# Patient Record
Sex: FEMALE | Race: OTHER
[De-identification: names, ages, dates, MRNs, and addresses within clinical notes are randomized per-mention and may not be internally consistent; named-entity substitution may affect disease eponyms.]

---

## 2018-03-21 PROBLEM — Z00.00 ENCOUNTER FOR PREVENTIVE HEALTH EXAMINATION: Status: ACTIVE | Noted: 2018-03-21

## 2018-03-22 ENCOUNTER — RX RENEWAL (OUTPATIENT)
Age: 58
End: 2018-03-22

## 2018-04-23 ENCOUNTER — RX RENEWAL (OUTPATIENT)
Age: 58
End: 2018-04-23

## 2018-04-23 DIAGNOSIS — R21 RASH AND OTHER NONSPECIFIC SKIN ERUPTION: ICD-10-CM

## 2018-05-12 ENCOUNTER — RECORD ABSTRACTING (OUTPATIENT)
Age: 58
End: 2018-05-12

## 2018-05-12 DIAGNOSIS — Z78.9 OTHER SPECIFIED HEALTH STATUS: ICD-10-CM

## 2018-05-12 DIAGNOSIS — Z83.3 FAMILY HISTORY OF DIABETES MELLITUS: ICD-10-CM

## 2018-05-12 RX ORDER — AZELAIC ACID 0.15 G/G
15 GEL TOPICAL
Refills: 0 | Status: ACTIVE | COMMUNITY

## 2018-05-12 RX ORDER — MELOXICAM 15 MG/1
15 TABLET ORAL
Refills: 0 | Status: ACTIVE | COMMUNITY

## 2018-05-16 ENCOUNTER — LABORATORY RESULT (OUTPATIENT)
Age: 58
End: 2018-05-16

## 2018-05-16 ENCOUNTER — APPOINTMENT (OUTPATIENT)
Dept: INTERNAL MEDICINE | Facility: CLINIC | Age: 58
End: 2018-05-16
Payer: COMMERCIAL

## 2018-05-16 VITALS
SYSTOLIC BLOOD PRESSURE: 148 MMHG | HEIGHT: 64 IN | DIASTOLIC BLOOD PRESSURE: 82 MMHG | BODY MASS INDEX: 48.32 KG/M2 | HEART RATE: 91 BPM | WEIGHT: 283 LBS

## 2018-05-16 DIAGNOSIS — R53.81 CHRONIC FATIGUE, UNSPECIFIED: ICD-10-CM

## 2018-05-16 DIAGNOSIS — L30.4 ERYTHEMA INTERTRIGO: ICD-10-CM

## 2018-05-16 DIAGNOSIS — R53.82 CHRONIC FATIGUE, UNSPECIFIED: ICD-10-CM

## 2018-05-16 PROCEDURE — 36415 COLL VENOUS BLD VENIPUNCTURE: CPT

## 2018-05-16 PROCEDURE — 99214 OFFICE O/P EST MOD 30 MIN: CPT | Mod: 25

## 2018-05-16 RX ORDER — TERBINAFINE HYDROCHLORIDE 250 MG/1
250 TABLET ORAL
Refills: 0 | Status: DISCONTINUED | COMMUNITY
End: 2018-05-16

## 2018-05-16 RX ORDER — GENTAMICIN SULFATE 3 MG/G
0.3 OINTMENT OPHTHALMIC
Refills: 0 | Status: DISCONTINUED | COMMUNITY
End: 2018-05-16

## 2018-05-16 NOTE — HISTORY OF PRESENT ILLNESS
[FreeTextEntry1] : follow up hypertension [de-identified] : patient with hypertension, hypothyroidism, for follow up, conditions stables, complains of general malaise, fatigue, erythematous wet rash between fingers left hand chronic for more than five months ,recurrent

## 2018-05-16 NOTE — REVIEW OF SYSTEMS
[Fatigue] : fatigue [Skin Rash] : skin rash [Negative] : Neurological [de-identified] : rash between fingers left hand

## 2018-05-16 NOTE — PHYSICAL EXAM
[No Acute Distress] : no acute distress [Well-Appearing] : well-appearing [Normal Sclera/Conjunctiva] : normal sclera/conjunctiva [PERRL] : pupils equal round and reactive to light [EOMI] : extraocular movements intact [Normal Outer Ear/Nose] : the outer ears and nose were normal in appearance [Normal Oropharynx] : the oropharynx was normal [No JVD] : no jugular venous distention [Supple] : supple [No Lymphadenopathy] : no lymphadenopathy [Thyroid Normal, No Nodules] : the thyroid was normal and there were no nodules present [No Respiratory Distress] : no respiratory distress  [Clear to Auscultation] : lungs were clear to auscultation bilaterally [Normal Rate] : normal rate  [Regular Rhythm] : with a regular rhythm [Normal S1, S2] : normal S1 and S2 [No Murmur] : no murmur heard [Soft] : abdomen soft [Non Tender] : non-tender [No HSM] : no HSM [Normal Bowel Sounds] : normal bowel sounds [Normal Gait] : normal gait [No Focal Deficits] : no focal deficits [de-identified] : scaly, erythematous, cracked skin between middle and ring fingers left hand

## 2018-05-17 LAB
25(OH)D3 SERPL-MCNC: 16.4 NG/ML
ALBUMIN SERPL ELPH-MCNC: 4.3 G/DL
ALP BLD-CCNC: 91 U/L
ALT SERPL-CCNC: 20 U/L
ANION GAP SERPL CALC-SCNC: 16 MMOL/L
APPEARANCE: CLEAR
AST SERPL-CCNC: 18 U/L
BACTERIA: NEGATIVE
BASOPHILS # BLD AUTO: 0 K/UL
BASOPHILS NFR BLD AUTO: 0 %
BILIRUB SERPL-MCNC: 0.3 MG/DL
BILIRUBIN URINE: NEGATIVE
BLOOD URINE: NEGATIVE
BUN SERPL-MCNC: 15 MG/DL
CALCIUM SERPL-MCNC: 9.3 MG/DL
CHLORIDE SERPL-SCNC: 102 MMOL/L
CHOLEST SERPL-MCNC: 198 MG/DL
CHOLEST/HDLC SERPL: 3.2 RATIO
CO2 SERPL-SCNC: 22 MMOL/L
COLOR: YELLOW
CREAT SERPL-MCNC: 0.71 MG/DL
EOSINOPHIL # BLD AUTO: 0.12 K/UL
EOSINOPHIL NFR BLD AUTO: 1.8 %
GLUCOSE QUALITATIVE U: NEGATIVE MG/DL
GLUCOSE SERPL-MCNC: 115 MG/DL
HBA1C MFR BLD HPLC: 5.8 %
HCT VFR BLD CALC: 41.4 %
HDLC SERPL-MCNC: 62 MG/DL
HGB BLD-MCNC: 13.2 G/DL
HYALINE CASTS: 1 /LPF
KETONES URINE: NEGATIVE
LDLC SERPL CALC-MCNC: 102 MG/DL
LEUKOCYTE ESTERASE URINE: NEGATIVE
LYMPHOCYTES # BLD AUTO: 1.8 K/UL
LYMPHOCYTES NFR BLD AUTO: 26.3 %
MAN DIFF?: NORMAL
MCHC RBC-ENTMCNC: 31.4 PG
MCHC RBC-ENTMCNC: 31.9 GM/DL
MCV RBC AUTO: 98.6 FL
MICROSCOPIC-UA: NORMAL
MONOCYTES # BLD AUTO: 0.3 K/UL
MONOCYTES NFR BLD AUTO: 4.4 %
NEUTROPHILS # BLD AUTO: 4.45 K/UL
NEUTROPHILS NFR BLD AUTO: 64.8 %
NITRITE URINE: NEGATIVE
PH URINE: 7
PLATELET # BLD AUTO: 280 K/UL
POTASSIUM SERPL-SCNC: 4.8 MMOL/L
PROT SERPL-MCNC: 6.8 G/DL
PROTEIN URINE: NEGATIVE MG/DL
RBC # BLD: 4.2 M/UL
RBC # FLD: 13.4 %
RED BLOOD CELLS URINE: 1 /HPF
SODIUM SERPL-SCNC: 140 MMOL/L
SPECIFIC GRAVITY URINE: 1.02
SQUAMOUS EPITHELIAL CELLS: 1 /HPF
T4 FREE SERPL-MCNC: 1.2 NG/DL
TRIGL SERPL-MCNC: 170 MG/DL
TSH SERPL-ACNC: 4.13 UIU/ML
UROBILINOGEN URINE: NEGATIVE MG/DL
VIT B12 SERPL-MCNC: 727 PG/ML
WBC # FLD AUTO: 6.86 K/UL
WHITE BLOOD CELLS URINE: 0 /HPF

## 2018-05-31 ENCOUNTER — APPOINTMENT (OUTPATIENT)
Dept: INTERNAL MEDICINE | Facility: CLINIC | Age: 58
End: 2018-05-31
Payer: COMMERCIAL

## 2018-05-31 VITALS
BODY MASS INDEX: 47.32 KG/M2 | HEIGHT: 65 IN | HEART RATE: 90 BPM | SYSTOLIC BLOOD PRESSURE: 145 MMHG | DIASTOLIC BLOOD PRESSURE: 84 MMHG | WEIGHT: 284 LBS

## 2018-05-31 DIAGNOSIS — M54.5 LOW BACK PAIN: ICD-10-CM

## 2018-05-31 PROCEDURE — 99214 OFFICE O/P EST MOD 30 MIN: CPT

## 2018-05-31 NOTE — PHYSICAL EXAM
[No Acute Distress] : no acute distress [Well-Appearing] : well-appearing [Normal Sclera/Conjunctiva] : normal sclera/conjunctiva [PERRL] : pupils equal round and reactive to light [EOMI] : extraocular movements intact [No JVD] : no jugular venous distention [Supple] : supple [No Lymphadenopathy] : no lymphadenopathy [Thyroid Normal, No Nodules] : the thyroid was normal and there were no nodules present [No Respiratory Distress] : no respiratory distress  [Clear to Auscultation] : lungs were clear to auscultation bilaterally [Normal Rate] : normal rate  [Regular Rhythm] : with a regular rhythm [Normal S1, S2] : normal S1 and S2 [No Murmur] : no murmur heard [Soft] : abdomen soft [Non Tender] : non-tender [No HSM] : no HSM [Normal Bowel Sounds] : normal bowel sounds [No Focal Deficits] : no focal deficits

## 2018-05-31 NOTE — HISTORY OF PRESENT ILLNESS
[FreeTextEntry1] : LAB RESULTS [de-identified] : patient here today to review and discuss labs results, no acute complains\par

## 2018-05-31 NOTE — ASSESSMENT
[FreeTextEntry1] : low cholesterol, low triglycerides diet,dietary counseling given; dietary avoidance discussed; diet and exercise reviewed with patient\par Dietary counseling given, dietary avoidance discussed, diet and exercise reviewed with patient; patient reminded of importance of aerobic exercise, weight control, dietary compliance and regular glucose monitoring\par

## 2018-06-21 ENCOUNTER — RX RENEWAL (OUTPATIENT)
Age: 58
End: 2018-06-21

## 2018-07-23 ENCOUNTER — MEDICATION RENEWAL (OUTPATIENT)
Age: 58
End: 2018-07-23

## 2018-07-31 ENCOUNTER — RX RENEWAL (OUTPATIENT)
Age: 58
End: 2018-07-31

## 2018-09-05 ENCOUNTER — RECORD ABSTRACTING (OUTPATIENT)
Age: 58
End: 2018-09-05

## 2018-09-06 ENCOUNTER — APPOINTMENT (OUTPATIENT)
Dept: INTERNAL MEDICINE | Facility: CLINIC | Age: 58
End: 2018-09-06
Payer: COMMERCIAL

## 2018-09-06 PROCEDURE — 36415 COLL VENOUS BLD VENIPUNCTURE: CPT

## 2018-09-07 LAB
25(OH)D3 SERPL-MCNC: 19.4 NG/ML
ALBUMIN SERPL ELPH-MCNC: 4.2 G/DL
ALP BLD-CCNC: 93 U/L
ALT SERPL-CCNC: 18 U/L
ANION GAP SERPL CALC-SCNC: 16 MMOL/L
AST SERPL-CCNC: 17 U/L
BILIRUB SERPL-MCNC: 0.4 MG/DL
BUN SERPL-MCNC: 11 MG/DL
CALCIUM SERPL-MCNC: 9 MG/DL
CHLORIDE SERPL-SCNC: 104 MMOL/L
CHOLEST SERPL-MCNC: 188 MG/DL
CHOLEST/HDLC SERPL: 3.4 RATIO
CO2 SERPL-SCNC: 22 MMOL/L
CREAT SERPL-MCNC: 0.61 MG/DL
GLUCOSE SERPL-MCNC: 108 MG/DL
HDLC SERPL-MCNC: 56 MG/DL
LDLC SERPL CALC-MCNC: 101 MG/DL
POTASSIUM SERPL-SCNC: 4.7 MMOL/L
PROT SERPL-MCNC: 6.7 G/DL
SODIUM SERPL-SCNC: 142 MMOL/L
TRIGL SERPL-MCNC: 155 MG/DL

## 2018-09-08 LAB — HBA1C MFR BLD HPLC: 5.9 %

## 2018-09-11 ENCOUNTER — RX RENEWAL (OUTPATIENT)
Age: 58
End: 2018-09-11

## 2018-09-26 ENCOUNTER — APPOINTMENT (OUTPATIENT)
Dept: INTERNAL MEDICINE | Facility: CLINIC | Age: 58
End: 2018-09-26
Payer: COMMERCIAL

## 2018-09-26 VITALS
HEART RATE: 87 BPM | SYSTOLIC BLOOD PRESSURE: 144 MMHG | HEIGHT: 65 IN | WEIGHT: 281 LBS | BODY MASS INDEX: 46.82 KG/M2 | DIASTOLIC BLOOD PRESSURE: 92 MMHG

## 2018-09-26 PROCEDURE — 99214 OFFICE O/P EST MOD 30 MIN: CPT

## 2018-09-26 RX ORDER — HALOBETASOL PROPIONATE 0.5 MG/G
0.05 OINTMENT TOPICAL TWICE DAILY
Qty: 1 | Refills: 1 | Status: DISCONTINUED | COMMUNITY
Start: 2018-09-11 | End: 2018-09-26

## 2018-09-26 NOTE — PLAN
[FreeTextEntry1] : low cholesterol, low triglycerides diet,dietary counseling given; dietary avoidance discussed; diet and exercise reviewed with patient\par Dietary counseling given, dietary avoidance discussed, diet and exercise reviewed with patient; patient reminded of importance of aerobic exercise, weight control, dietary compliance and regular glucose monitoring\par follow up in three months for labs; she had flu vaccine applied at [facility] last month

## 2018-09-26 NOTE — HISTORY OF PRESENT ILLNESS
[FreeTextEntry1] : lab results\par Interview and discussion conducted in [place] by [place] speaking Physician.\par  [de-identified] : patient here today to review and discuss labs results, no acute complains\par

## 2018-10-19 ENCOUNTER — RX RENEWAL (OUTPATIENT)
Age: 58
End: 2018-10-19

## 2018-12-19 ENCOUNTER — APPOINTMENT (OUTPATIENT)
Dept: INTERNAL MEDICINE | Facility: CLINIC | Age: 58
End: 2018-12-19
Payer: COMMERCIAL

## 2018-12-19 ENCOUNTER — RX RENEWAL (OUTPATIENT)
Age: 58
End: 2018-12-19

## 2018-12-19 PROCEDURE — 36415 COLL VENOUS BLD VENIPUNCTURE: CPT

## 2018-12-20 LAB
25(OH)D3 SERPL-MCNC: 21.3 NG/ML
ALBUMIN SERPL ELPH-MCNC: 4.3 G/DL
ALP BLD-CCNC: 87 U/L
ALT SERPL-CCNC: 17 U/L
ANION GAP SERPL CALC-SCNC: 10 MMOL/L
AST SERPL-CCNC: 15 U/L
BILIRUB SERPL-MCNC: 0.5 MG/DL
BUN SERPL-MCNC: 15 MG/DL
CALCIUM SERPL-MCNC: 9 MG/DL
CHLORIDE SERPL-SCNC: 104 MMOL/L
CHOLEST SERPL-MCNC: 177 MG/DL
CHOLEST/HDLC SERPL: 3.1 RATIO
CO2 SERPL-SCNC: 27 MMOL/L
CREAT SERPL-MCNC: 0.56 MG/DL
GLUCOSE SERPL-MCNC: 112 MG/DL
HBA1C MFR BLD HPLC: 5.7 %
HDLC SERPL-MCNC: 57 MG/DL
LDLC SERPL CALC-MCNC: 91 MG/DL
POTASSIUM SERPL-SCNC: 4.6 MMOL/L
PROT SERPL-MCNC: 6.7 G/DL
SODIUM SERPL-SCNC: 141 MMOL/L
T3FREE SERPL-MCNC: 2.98 PG/ML
T4 FREE SERPL-MCNC: 1.2 NG/DL
TRIGL SERPL-MCNC: 147 MG/DL
TSH SERPL-ACNC: 4.03 UIU/ML

## 2019-01-16 ENCOUNTER — APPOINTMENT (OUTPATIENT)
Dept: INTERNAL MEDICINE | Facility: CLINIC | Age: 59
End: 2019-01-16
Payer: COMMERCIAL

## 2019-01-16 VITALS
OXYGEN SATURATION: 93 % | DIASTOLIC BLOOD PRESSURE: 83 MMHG | HEIGHT: 65 IN | BODY MASS INDEX: 45.98 KG/M2 | SYSTOLIC BLOOD PRESSURE: 161 MMHG | HEART RATE: 77 BPM | TEMPERATURE: 97.4 F | WEIGHT: 276 LBS

## 2019-01-16 DIAGNOSIS — R05 COUGH: ICD-10-CM

## 2019-01-16 PROCEDURE — 99214 OFFICE O/P EST MOD 30 MIN: CPT

## 2019-01-16 RX ORDER — PROMETHAZINE HYDROCHLORIDE AND DEXTROMETHORPHAN HYDROBROMIDE ORAL SOLUTION 15; 6.25 MG/5ML; MG/5ML
6.25-15 SOLUTION ORAL
Qty: 210 | Refills: 0 | Status: ACTIVE | COMMUNITY
Start: 2019-01-16 | End: 1900-01-01

## 2019-01-16 NOTE — PLAN
[FreeTextEntry1] : low cholesterol, low triglycerides diet,dietary counseling given; dietary avoidance discussed; diet and exercise reviewed with patient\par Dietary counseling given, dietary avoidance discussed, diet and exercise reviewed with patient; patient reminded of importance of aerobic exercise, weight control, dietary compliance and regular glucose monitoring\par follow up in three months for general labs

## 2019-01-16 NOTE — HISTORY OF PRESENT ILLNESS
[FreeTextEntry1] : lab results\par cough\par headache\par high blood pressure \par Interview and discussion conducted in Albanian by Albanian speaking Physician.\par  [de-identified] : patient here today to review and discuss labs results, complains of resolving cold symptoms, cough productive of yellowish sputum, headache\par

## 2019-01-22 ENCOUNTER — RX RENEWAL (OUTPATIENT)
Age: 59
End: 2019-01-22

## 2019-05-22 ENCOUNTER — RX RENEWAL (OUTPATIENT)
Age: 59
End: 2019-05-22

## 2019-06-12 ENCOUNTER — APPOINTMENT (OUTPATIENT)
Dept: INTERNAL MEDICINE | Facility: CLINIC | Age: 59
End: 2019-06-12
Payer: COMMERCIAL

## 2019-06-12 PROCEDURE — 36415 COLL VENOUS BLD VENIPUNCTURE: CPT

## 2019-06-13 LAB
ALBUMIN SERPL ELPH-MCNC: 4.2 G/DL
ALP BLD-CCNC: 90 U/L
ALT SERPL-CCNC: 27 U/L
ANION GAP SERPL CALC-SCNC: 13 MMOL/L
APPEARANCE: CLEAR
AST SERPL-CCNC: 22 U/L
BACTERIA: ABNORMAL
BASOPHILS # BLD AUTO: 0.05 K/UL
BASOPHILS NFR BLD AUTO: 0.7 %
BILIRUB SERPL-MCNC: 0.3 MG/DL
BILIRUBIN URINE: NEGATIVE
BLOOD URINE: NEGATIVE
BUN SERPL-MCNC: 11 MG/DL
CALCIUM SERPL-MCNC: 8.8 MG/DL
CHLORIDE SERPL-SCNC: 103 MMOL/L
CHOLEST SERPL-MCNC: 194 MG/DL
CHOLEST/HDLC SERPL: 3.3 RATIO
CO2 SERPL-SCNC: 25 MMOL/L
COLOR: NORMAL
CREAT SERPL-MCNC: 0.59 MG/DL
EOSINOPHIL # BLD AUTO: 0.17 K/UL
EOSINOPHIL NFR BLD AUTO: 2.5 %
ESTIMATED AVERAGE GLUCOSE: 120 MG/DL
GLUCOSE QUALITATIVE U: NEGATIVE
GLUCOSE SERPL-MCNC: 103 MG/DL
HBA1C MFR BLD HPLC: 5.8 %
HCT VFR BLD CALC: 42.9 %
HDLC SERPL-MCNC: 58 MG/DL
HGB BLD-MCNC: 13.2 G/DL
HYALINE CASTS: 0 /LPF
IMM GRANULOCYTES NFR BLD AUTO: 1.6 %
KETONES URINE: NEGATIVE
LDLC SERPL CALC-MCNC: 103 MG/DL
LEUKOCYTE ESTERASE URINE: NEGATIVE
LYMPHOCYTES # BLD AUTO: 1.92 K/UL
LYMPHOCYTES NFR BLD AUTO: 28.5 %
MAN DIFF?: NORMAL
MCHC RBC-ENTMCNC: 30.8 GM/DL
MCHC RBC-ENTMCNC: 32.3 PG
MCV RBC AUTO: 104.9 FL
MICROSCOPIC-UA: NORMAL
MONOCYTES # BLD AUTO: 0.5 K/UL
MONOCYTES NFR BLD AUTO: 7.4 %
NEUTROPHILS # BLD AUTO: 3.98 K/UL
NEUTROPHILS NFR BLD AUTO: 59.3 %
NITRITE URINE: NEGATIVE
PH URINE: 6.5
PLATELET # BLD AUTO: 265 K/UL
POTASSIUM SERPL-SCNC: 4.4 MMOL/L
PROT SERPL-MCNC: 6.5 G/DL
PROTEIN URINE: NEGATIVE
RBC # BLD: 4.09 M/UL
RBC # FLD: 13.2 %
RED BLOOD CELLS URINE: 1 /HPF
SODIUM SERPL-SCNC: 141 MMOL/L
SPECIFIC GRAVITY URINE: 1.02
SQUAMOUS EPITHELIAL CELLS: 4 /HPF
T4 FREE SERPL-MCNC: 1.1 NG/DL
TRIGL SERPL-MCNC: 164 MG/DL
TSH SERPL-ACNC: 5.98 UIU/ML
UROBILINOGEN URINE: NORMAL
VIT B12 SERPL-MCNC: 1297 PG/ML
WBC # FLD AUTO: 6.73 K/UL
WHITE BLOOD CELLS URINE: 1 /HPF

## 2019-06-14 LAB — 25(OH)D3 SERPL-MCNC: 16 NG/ML

## 2019-07-03 ENCOUNTER — RX CHANGE (OUTPATIENT)
Age: 59
End: 2019-07-03

## 2019-07-03 ENCOUNTER — APPOINTMENT (OUTPATIENT)
Dept: INTERNAL MEDICINE | Facility: CLINIC | Age: 59
End: 2019-07-03
Payer: MEDICAID

## 2019-07-03 VITALS
RESPIRATION RATE: 16 BRPM | HEART RATE: 77 BPM | HEIGHT: 65 IN | BODY MASS INDEX: 45.98 KG/M2 | SYSTOLIC BLOOD PRESSURE: 163 MMHG | DIASTOLIC BLOOD PRESSURE: 100 MMHG | WEIGHT: 276 LBS | OXYGEN SATURATION: 96 % | TEMPERATURE: 98.5 F

## 2019-07-03 PROCEDURE — 99214 OFFICE O/P EST MOD 30 MIN: CPT

## 2019-07-03 RX ORDER — CLOBETASOL PROPIONATE 0.5 MG/G
0.05 OINTMENT TOPICAL TWICE DAILY
Qty: 1 | Refills: 0 | Status: DISCONTINUED | COMMUNITY
Start: 2018-04-23 | End: 2019-07-03

## 2019-07-03 RX ORDER — LOSARTAN POTASSIUM 50 MG/1
50 TABLET, FILM COATED ORAL
Qty: 90 | Refills: 1 | Status: DISCONTINUED | COMMUNITY
Start: 2018-04-23 | End: 2019-07-03

## 2019-07-03 NOTE — HISTORY OF PRESENT ILLNESS
[FreeTextEntry1] : lab results\par Interview and discussion conducted in Nepalese by Nepalese speaking Physician.\par  [de-identified] : patient here today to review and discuss labs results, no acute complains\par

## 2019-07-03 NOTE — PLAN
[FreeTextEntry1] : Compliance issues discussed; low sodium diet reinforced; weight loss recommended.\par low cholesterol, low triglycerides diet,dietary counseling given; dietary avoidance discussed; diet and exercise reviewed with patient\par follow up in three months for labs CMP, LIPIDS, VITAMIN D ,TFT

## 2019-07-03 NOTE — PHYSICAL EXAM
[No Acute Distress] : no acute distress [Well Nourished] : well nourished [Well Developed] : well developed [Well-Appearing] : well-appearing [Normal Sclera/Conjunctiva] : normal sclera/conjunctiva [PERRL] : pupils equal round and reactive to light [EOMI] : extraocular movements intact [Normal Outer Ear/Nose] : the outer ears and nose were normal in appearance [Normal Oropharynx] : the oropharynx was normal [No JVD] : no jugular venous distention [Thyroid Normal, No Nodules] : the thyroid was normal and there were no nodules present [Supple] : supple [No Lymphadenopathy] : no lymphadenopathy [No Respiratory Distress] : no respiratory distress  [No Accessory Muscle Use] : no accessory muscle use [Clear to Auscultation] : lungs were clear to auscultation bilaterally [Normal Rate] : normal rate  [Regular Rhythm] : with a regular rhythm [Normal S1, S2] : normal S1 and S2 [No Murmur] : no murmur heard [No Abdominal Bruit] : a ~M bruit was not heard ~T in the abdomen [No Carotid Bruits] : no carotid bruits [No Varicosities] : no varicosities [Pedal Pulses Present] : the pedal pulses are present [No Edema] : there was no peripheral edema [No Palpable Aorta] : no palpable aorta [No Extremity Clubbing/Cyanosis] : no extremity clubbing/cyanosis [Soft] : abdomen soft [Non Tender] : non-tender [Non-distended] : non-distended [No Masses] : no abdominal mass palpated [No HSM] : no HSM [Normal Bowel Sounds] : normal bowel sounds [Normal Posterior Cervical Nodes] : no posterior cervical lymphadenopathy [Normal Anterior Cervical Nodes] : no anterior cervical lymphadenopathy [No CVA Tenderness] : no CVA  tenderness [No Spinal Tenderness] : no spinal tenderness [No Joint Swelling] : no joint swelling [Grossly Normal Strength/Tone] : grossly normal strength/tone [No Rash] : no rash [Coordination Grossly Intact] : coordination grossly intact [No Focal Deficits] : no focal deficits [Normal Gait] : normal gait [Deep Tendon Reflexes (DTR)] : deep tendon reflexes were 2+ and symmetric [Normal Affect] : the affect was normal [Normal Insight/Judgement] : insight and judgment were intact

## 2019-10-16 ENCOUNTER — APPOINTMENT (OUTPATIENT)
Dept: INTERNAL MEDICINE | Facility: CLINIC | Age: 59
End: 2019-10-16
Payer: COMMERCIAL

## 2019-10-16 PROCEDURE — 36415 COLL VENOUS BLD VENIPUNCTURE: CPT

## 2019-10-17 LAB
25(OH)D3 SERPL-MCNC: 27.1 NG/ML
ALBUMIN SERPL ELPH-MCNC: 4.3 G/DL
ALP BLD-CCNC: 93 U/L
ALT SERPL-CCNC: 20 U/L
ANION GAP SERPL CALC-SCNC: 17 MMOL/L
AST SERPL-CCNC: 16 U/L
BILIRUB SERPL-MCNC: 0.3 MG/DL
BUN SERPL-MCNC: 15 MG/DL
CALCIUM SERPL-MCNC: 9.5 MG/DL
CHLORIDE SERPL-SCNC: 101 MMOL/L
CHOLEST SERPL-MCNC: 188 MG/DL
CHOLEST/HDLC SERPL: 3.4 RATIO
CO2 SERPL-SCNC: 23 MMOL/L
CREAT SERPL-MCNC: 0.6 MG/DL
GLUCOSE SERPL-MCNC: 113 MG/DL
HDLC SERPL-MCNC: 55 MG/DL
LDLC SERPL CALC-MCNC: 97 MG/DL
POTASSIUM SERPL-SCNC: 4 MMOL/L
PROT SERPL-MCNC: 7.1 G/DL
SODIUM SERPL-SCNC: 141 MMOL/L
T3FREE SERPL-MCNC: 2.78 PG/ML
T4 FREE SERPL-MCNC: 1.1 NG/DL
TRIGL SERPL-MCNC: 179 MG/DL
TSH SERPL-ACNC: 3.9 UIU/ML

## 2019-10-31 ENCOUNTER — APPOINTMENT (OUTPATIENT)
Dept: INTERNAL MEDICINE | Facility: CLINIC | Age: 59
End: 2019-10-31
Payer: COMMERCIAL

## 2019-10-31 VITALS
RESPIRATION RATE: 17 BRPM | WEIGHT: 281 LBS | OXYGEN SATURATION: 94 % | HEART RATE: 88 BPM | DIASTOLIC BLOOD PRESSURE: 82 MMHG | TEMPERATURE: 97.9 F | SYSTOLIC BLOOD PRESSURE: 145 MMHG | BODY MASS INDEX: 46.76 KG/M2

## 2019-10-31 DIAGNOSIS — R73.01 IMPAIRED FASTING GLUCOSE: ICD-10-CM

## 2019-10-31 DIAGNOSIS — L29.2 PRURITUS VULVAE: ICD-10-CM

## 2019-10-31 PROCEDURE — 36415 COLL VENOUS BLD VENIPUNCTURE: CPT

## 2019-10-31 PROCEDURE — G0009: CPT

## 2019-10-31 PROCEDURE — 99214 OFFICE O/P EST MOD 30 MIN: CPT | Mod: 25

## 2019-10-31 PROCEDURE — 90670 PCV13 VACCINE IM: CPT

## 2019-10-31 RX ORDER — FLUCONAZOLE 150 MG/1
150 TABLET ORAL
Qty: 2 | Refills: 0 | Status: ACTIVE | COMMUNITY
Start: 2019-10-31 | End: 1900-01-01

## 2019-10-31 NOTE — HISTORY OF PRESENT ILLNESS
[FreeTextEntry1] : lab results\par Interview and discussion conducted in Filipino by Filipino speaking Physician.\par  [de-identified] : 59 years old female here to review and discuss labs results, complains of chronic left ear rash behind lobe with pruritus, also vulvar pruritus

## 2019-10-31 NOTE — PLAN
[FreeTextEntry1] : low cholesterol, low triglycerides diet,dietary counseling given; dietary avoidance discussed; diet and exercise reviewed with patient\par Dietary counseling given, dietary avoidance discussed, diet and exercise reviewed with patient; patient reminded of importance of aerobic exercise, weight control, dietary compliance and regular glucose monitoring\par follow up in three months for labs CMP, LIPIDS, A1C, TFT

## 2019-10-31 NOTE — PHYSICAL EXAM
[No Acute Distress] : no acute distress [Well Nourished] : well nourished [Well Developed] : well developed [Well-Appearing] : well-appearing [Normal Sclera/Conjunctiva] : normal sclera/conjunctiva [PERRL] : pupils equal round and reactive to light [EOMI] : extraocular movements intact [Normal Outer Ear/Nose] : the outer ears and nose were normal in appearance [Normal Oropharynx] : the oropharynx was normal [No JVD] : no jugular venous distention [No Lymphadenopathy] : no lymphadenopathy [Supple] : supple [Thyroid Normal, No Nodules] : the thyroid was normal and there were no nodules present [No Respiratory Distress] : no respiratory distress  [No Accessory Muscle Use] : no accessory muscle use [Clear to Auscultation] : lungs were clear to auscultation bilaterally [Normal Rate] : normal rate  [Regular Rhythm] : with a regular rhythm [Normal S1, S2] : normal S1 and S2 [No Murmur] : no murmur heard [No Carotid Bruits] : no carotid bruits [No Abdominal Bruit] : a ~M bruit was not heard ~T in the abdomen [No Varicosities] : no varicosities [Pedal Pulses Present] : the pedal pulses are present [No Edema] : there was no peripheral edema [No Palpable Aorta] : no palpable aorta [No Extremity Clubbing/Cyanosis] : no extremity clubbing/cyanosis [Soft] : abdomen soft [Non Tender] : non-tender [Non-distended] : non-distended [No Masses] : no abdominal mass palpated [No HSM] : no HSM [Normal Bowel Sounds] : normal bowel sounds [Normal Posterior Cervical Nodes] : no posterior cervical lymphadenopathy [Normal Anterior Cervical Nodes] : no anterior cervical lymphadenopathy [No CVA Tenderness] : no CVA  tenderness [No Spinal Tenderness] : no spinal tenderness [No Joint Swelling] : no joint swelling [Grossly Normal Strength/Tone] : grossly normal strength/tone [Coordination Grossly Intact] : coordination grossly intact [No Focal Deficits] : no focal deficits [Normal Gait] : normal gait [Deep Tendon Reflexes (DTR)] : deep tendon reflexes were 2+ and symmetric [Normal Affect] : the affect was normal [Normal Insight/Judgement] : insight and judgment were intact [de-identified] : erythematous ,scaly rash behind left ear lobe

## 2019-11-01 LAB
ESTIMATED AVERAGE GLUCOSE: 123 MG/DL
HBA1C MFR BLD HPLC: 5.9 %

## 2019-12-05 ENCOUNTER — RX RENEWAL (OUTPATIENT)
Age: 59
End: 2019-12-05

## 2020-01-08 RX ORDER — NAPROXEN 500 MG/1
500 TABLET ORAL
Qty: 30 | Refills: 2 | Status: ACTIVE | COMMUNITY
Start: 2018-03-22 | End: 1900-01-01

## 2020-01-30 ENCOUNTER — APPOINTMENT (OUTPATIENT)
Dept: INTERNAL MEDICINE | Facility: CLINIC | Age: 60
End: 2020-01-30

## 2020-03-30 RX ORDER — SIMVASTATIN 20 MG/1
20 TABLET, FILM COATED ORAL
Qty: 30 | Refills: 2 | Status: COMPLETED | COMMUNITY
Start: 2018-04-23 | End: 2020-03-30

## 2020-03-30 RX ORDER — SIMVASTATIN 20 MG/1
20 TABLET, FILM COATED ORAL DAILY
Qty: 90 | Refills: 1 | Status: ACTIVE | COMMUNITY
Start: 1900-01-01 | End: 1900-01-01

## 2020-04-22 ENCOUNTER — RX RENEWAL (OUTPATIENT)
Age: 60
End: 2020-04-22

## 2020-05-19 ENCOUNTER — RX RENEWAL (OUTPATIENT)
Age: 60
End: 2020-05-19

## 2020-05-19 RX ORDER — NAPROXEN 500 MG/1
500 TABLET ORAL
Qty: 30 | Refills: 0 | Status: ACTIVE | COMMUNITY
Start: 2018-07-31 | End: 1900-01-01

## 2020-06-22 ENCOUNTER — NON-APPOINTMENT (OUTPATIENT)
Age: 60
End: 2020-06-22

## 2020-06-22 ENCOUNTER — APPOINTMENT (OUTPATIENT)
Dept: INTERNAL MEDICINE | Facility: CLINIC | Age: 60
End: 2020-06-22
Payer: MEDICAID

## 2020-06-22 VITALS
WEIGHT: 272 LBS | TEMPERATURE: 96.5 F | HEART RATE: 92 BPM | OXYGEN SATURATION: 98 % | HEIGHT: 65 IN | DIASTOLIC BLOOD PRESSURE: 84 MMHG | RESPIRATION RATE: 17 BRPM | BODY MASS INDEX: 45.32 KG/M2 | SYSTOLIC BLOOD PRESSURE: 135 MMHG

## 2020-06-22 DIAGNOSIS — Z12.31 ENCOUNTER FOR SCREENING MAMMOGRAM FOR MALIGNANT NEOPLASM OF BREAST: ICD-10-CM

## 2020-06-22 DIAGNOSIS — Z12.11 ENCOUNTER FOR SCREENING FOR MALIGNANT NEOPLASM OF COLON: ICD-10-CM

## 2020-06-22 DIAGNOSIS — L21.9 SEBORRHEIC DERMATITIS, UNSPECIFIED: ICD-10-CM

## 2020-06-22 DIAGNOSIS — Z23 ENCOUNTER FOR IMMUNIZATION: ICD-10-CM

## 2020-06-22 DIAGNOSIS — Z11.59 ENCOUNTER FOR SCREENING FOR OTHER VIRAL DISEASES: ICD-10-CM

## 2020-06-22 DIAGNOSIS — L40.9 PSORIASIS, UNSPECIFIED: ICD-10-CM

## 2020-06-22 DIAGNOSIS — L71.9 ROSACEA, UNSPECIFIED: ICD-10-CM

## 2020-06-22 LAB
BASOPHILS # BLD AUTO: 0.05 K/UL
BASOPHILS NFR BLD AUTO: 0.6 %
EOSINOPHIL # BLD AUTO: 0.22 K/UL
EOSINOPHIL NFR BLD AUTO: 2.8 %
HCT VFR BLD CALC: 45.8 %
HGB BLD-MCNC: 14.6 G/DL
IMM GRANULOCYTES NFR BLD AUTO: 1.5 %
LYMPHOCYTES # BLD AUTO: 2.02 K/UL
LYMPHOCYTES NFR BLD AUTO: 25.8 %
MAN DIFF?: NORMAL
MCHC RBC-ENTMCNC: 31.9 GM/DL
MCHC RBC-ENTMCNC: 32 PG
MCV RBC AUTO: 100.4 FL
MONOCYTES # BLD AUTO: 0.65 K/UL
MONOCYTES NFR BLD AUTO: 8.3 %
NEUTROPHILS # BLD AUTO: 4.77 K/UL
NEUTROPHILS NFR BLD AUTO: 61 %
PLATELET # BLD AUTO: 303 K/UL
RBC # BLD: 4.56 M/UL
RBC # FLD: 12.9 %
WBC # FLD AUTO: 7.83 K/UL

## 2020-06-22 PROCEDURE — 90732 PPSV23 VACC 2 YRS+ SUBQ/IM: CPT

## 2020-06-22 PROCEDURE — 99214 OFFICE O/P EST MOD 30 MIN: CPT | Mod: 25

## 2020-06-22 PROCEDURE — G0009: CPT

## 2020-06-22 RX ORDER — HALOBETASOL PROPIONATE 0.5 MG/G
0.05 OINTMENT TOPICAL TWICE DAILY
Qty: 60 | Refills: 0 | Status: DISCONTINUED | COMMUNITY
Start: 2019-07-03 | End: 2020-06-22

## 2020-06-22 RX ORDER — CLOBETASOL PROPIONATE 0.5 MG/G
0.05 OINTMENT TOPICAL TWICE DAILY
Qty: 1 | Refills: 2 | Status: ACTIVE | COMMUNITY
Start: 2020-06-22 | End: 1900-01-01

## 2020-06-22 RX ORDER — CLOTRIMAZOLE AND BETAMETHASONE DIPROPIONATE 10; .5 MG/G; MG/G
1-0.05 CREAM TOPICAL TWICE DAILY
Qty: 1 | Refills: 1 | Status: ACTIVE | COMMUNITY
Start: 2018-05-16 | End: 1900-01-01

## 2020-06-22 NOTE — HISTORY OF PRESENT ILLNESS
[de-identified] : 59 years old female here for follow up, last time seen in October, states feeling well no acute complains, requesting refill of medications, and for labs  [FreeTextEntry1] : follow up hypertension, hypothyroidism\par Interview and discussion conducted in Czech by Czech speaking physician\par

## 2020-06-22 NOTE — PLAN
[FreeTextEntry1] : 1.hypertension refill of losartan/hctz, low sodium diet reinforced.\par routine general labs today\par 2. hypothyroidism refill of levothyroxine 100 mcg daily\par 3. hyperlipidemia dietary counselled.\par 4.psoriasis stable , to follow up with dermatologist.\par 5. HM Pneumovax 23 today; cancer screening mammogram referral ordered, FIT test ; \par schedule follow up in two weeks to review labs results.

## 2020-06-22 NOTE — PHYSICAL EXAM
[No Acute Distress] : no acute distress [Well Nourished] : well nourished [Well Developed] : well developed [Well-Appearing] : well-appearing [Normal Sclera/Conjunctiva] : normal sclera/conjunctiva [PERRL] : pupils equal round and reactive to light [EOMI] : extraocular movements intact [Normal Outer Ear/Nose] : the outer ears and nose were normal in appearance [Normal Oropharynx] : the oropharynx was normal [No JVD] : no jugular venous distention [No Lymphadenopathy] : no lymphadenopathy [Thyroid Normal, No Nodules] : the thyroid was normal and there were no nodules present [Supple] : supple [No Respiratory Distress] : no respiratory distress  [No Accessory Muscle Use] : no accessory muscle use [Clear to Auscultation] : lungs were clear to auscultation bilaterally [Normal Rate] : normal rate  [Regular Rhythm] : with a regular rhythm [Normal S1, S2] : normal S1 and S2 [No Murmur] : no murmur heard [No Carotid Bruits] : no carotid bruits [No Abdominal Bruit] : a ~M bruit was not heard ~T in the abdomen [Pedal Pulses Present] : the pedal pulses are present [No Varicosities] : no varicosities [No Edema] : there was no peripheral edema [No Palpable Aorta] : no palpable aorta [Soft] : abdomen soft [No Extremity Clubbing/Cyanosis] : no extremity clubbing/cyanosis [Non Tender] : non-tender [Non-distended] : non-distended [No HSM] : no HSM [No Masses] : no abdominal mass palpated [Normal Bowel Sounds] : normal bowel sounds [Normal Posterior Cervical Nodes] : no posterior cervical lymphadenopathy [Normal Anterior Cervical Nodes] : no anterior cervical lymphadenopathy [No CVA Tenderness] : no CVA  tenderness [No Joint Swelling] : no joint swelling [No Spinal Tenderness] : no spinal tenderness [Grossly Normal Strength/Tone] : grossly normal strength/tone [Coordination Grossly Intact] : coordination grossly intact [No Rash] : no rash [Deep Tendon Reflexes (DTR)] : deep tendon reflexes were 2+ and symmetric [No Focal Deficits] : no focal deficits [Normal Gait] : normal gait [Normal Affect] : the affect was normal [Normal Insight/Judgement] : insight and judgment were intact

## 2020-06-23 LAB
25(OH)D3 SERPL-MCNC: 26.3 NG/ML
ALBUMIN SERPL ELPH-MCNC: 4.4 G/DL
ALP BLD-CCNC: 85 U/L
ALT SERPL-CCNC: 23 U/L
ANION GAP SERPL CALC-SCNC: 15 MMOL/L
AST SERPL-CCNC: 21 U/L
BILIRUB SERPL-MCNC: 0.5 MG/DL
BUN SERPL-MCNC: 12 MG/DL
CALCIUM SERPL-MCNC: 9.2 MG/DL
CHLORIDE SERPL-SCNC: 100 MMOL/L
CHOLEST SERPL-MCNC: 238 MG/DL
CHOLEST/HDLC SERPL: 4.2 RATIO
CO2 SERPL-SCNC: 24 MMOL/L
CREAT SERPL-MCNC: 0.74 MG/DL
ESTIMATED AVERAGE GLUCOSE: 120 MG/DL
GLUCOSE SERPL-MCNC: 119 MG/DL
HBA1C MFR BLD HPLC: 5.8 %
HDLC SERPL-MCNC: 56 MG/DL
LDLC SERPL CALC-MCNC: 139 MG/DL
POTASSIUM SERPL-SCNC: 4 MMOL/L
PROT SERPL-MCNC: 6.8 G/DL
SARS-COV-2 IGG SERPL IA-ACNC: <0.1 INDEX
SARS-COV-2 IGG SERPL QL IA: NEGATIVE
SODIUM SERPL-SCNC: 139 MMOL/L
T4 FREE SERPL-MCNC: 1.1 NG/DL
TRIGL SERPL-MCNC: 215 MG/DL
TSH SERPL-ACNC: 3.57 UIU/ML
VIT B12 SERPL-MCNC: 1412 PG/ML

## 2020-06-30 ENCOUNTER — LABORATORY RESULT (OUTPATIENT)
Age: 60
End: 2020-06-30

## 2020-07-08 ENCOUNTER — APPOINTMENT (OUTPATIENT)
Dept: INTERNAL MEDICINE | Facility: CLINIC | Age: 60
End: 2020-07-08
Payer: MEDICAID

## 2020-07-08 VITALS
WEIGHT: 278 LBS | BODY MASS INDEX: 46.32 KG/M2 | HEART RATE: 90 BPM | SYSTOLIC BLOOD PRESSURE: 152 MMHG | HEIGHT: 65 IN | DIASTOLIC BLOOD PRESSURE: 86 MMHG | TEMPERATURE: 97.1 F | OXYGEN SATURATION: 96 %

## 2020-07-08 DIAGNOSIS — E78.2 MIXED HYPERLIPIDEMIA: ICD-10-CM

## 2020-07-08 DIAGNOSIS — I10 ESSENTIAL (PRIMARY) HYPERTENSION: ICD-10-CM

## 2020-07-08 DIAGNOSIS — M67.441 GANGLION, RIGHT HAND: ICD-10-CM

## 2020-07-08 DIAGNOSIS — E03.9 HYPOTHYROIDISM, UNSPECIFIED: ICD-10-CM

## 2020-07-08 DIAGNOSIS — E55.9 VITAMIN D DEFICIENCY, UNSPECIFIED: ICD-10-CM

## 2020-07-08 DIAGNOSIS — R73.03 PREDIABETES.: ICD-10-CM

## 2020-07-08 PROCEDURE — 99214 OFFICE O/P EST MOD 30 MIN: CPT

## 2020-07-08 RX ORDER — ERGOCALCIFEROL 1.25 MG/1
1.25 MG CAPSULE ORAL
Qty: 12 | Refills: 1 | Status: ACTIVE | COMMUNITY
Start: 1900-01-01 | End: 1900-01-01

## 2020-07-08 NOTE — PLAN
[FreeTextEntry1] : 1.hypertension\par * low sodium diet reinforced\par * continue losartan/hctz\par 2. hypothyroidism\par *increase levothyroxine to 88 mcg daily\par 3. hyperlipidemia\par * dietary compliance discussed, stressed\par low cholesterol, low triglycerides diet,dietary counseling given; dietary avoidance discussed; diet and exercise reviewed with patient\par * continue simvastatin\par 4. pre diabetes\par Dietary counseling given, dietary avoidance discussed, diet and exercise reviewed with patient; patient reminded of importance of aerobic exercise, weight control, dietary compliance and regular glucose monitoring\par 5. vitamin d deficiency\par * continue supplement\par 6. ganglion right hand\par * orthopedic referral\par follow up in three months

## 2020-07-08 NOTE — PHYSICAL EXAM
[No Acute Distress] : no acute distress [Well-Appearing] : well-appearing [Well Developed] : well developed [Well Nourished] : well nourished [Normal Sclera/Conjunctiva] : normal sclera/conjunctiva [PERRL] : pupils equal round and reactive to light [EOMI] : extraocular movements intact [Normal Outer Ear/Nose] : the outer ears and nose were normal in appearance [Normal Oropharynx] : the oropharynx was normal [No JVD] : no jugular venous distention [No Lymphadenopathy] : no lymphadenopathy [Supple] : supple [Thyroid Normal, No Nodules] : the thyroid was normal and there were no nodules present [No Accessory Muscle Use] : no accessory muscle use [No Respiratory Distress] : no respiratory distress  [Clear to Auscultation] : lungs were clear to auscultation bilaterally [Normal Rate] : normal rate  [Regular Rhythm] : with a regular rhythm [Normal S1, S2] : normal S1 and S2 [No Murmur] : no murmur heard [No Carotid Bruits] : no carotid bruits [No Abdominal Bruit] : a ~M bruit was not heard ~T in the abdomen [No Varicosities] : no varicosities [Pedal Pulses Present] : the pedal pulses are present [No Palpable Aorta] : no palpable aorta [No Edema] : there was no peripheral edema [No Extremity Clubbing/Cyanosis] : no extremity clubbing/cyanosis [Soft] : abdomen soft [Non Tender] : non-tender [Non-distended] : non-distended [Normal Bowel Sounds] : normal bowel sounds [No HSM] : no HSM [No Masses] : no abdominal mass palpated [Normal Anterior Cervical Nodes] : no anterior cervical lymphadenopathy [Normal Posterior Cervical Nodes] : no posterior cervical lymphadenopathy [No Spinal Tenderness] : no spinal tenderness [No Joint Swelling] : no joint swelling [No CVA Tenderness] : no CVA  tenderness [Grossly Normal Strength/Tone] : grossly normal strength/tone [Coordination Grossly Intact] : coordination grossly intact [No Rash] : no rash [Normal Gait] : normal gait [No Focal Deficits] : no focal deficits [Normal Affect] : the affect was normal [Normal Insight/Judgement] : insight and judgment were intact [Deep Tendon Reflexes (DTR)] : deep tendon reflexes were 2+ and symmetric

## 2020-07-08 NOTE — HISTORY OF PRESENT ILLNESS
[de-identified] : 59 years old female here for follow up to review and discus labs results, complains of hard nodule on right hand palm , painful when she  grasp knife to cut vegetables; states she is loosing hair, feeling fatigued\par  [FreeTextEntry1] : follow up for labs results\par Interview and discussion conducted in Sammarinese by Sammarinese speaking physician\par

## 2020-08-11 ENCOUNTER — APPOINTMENT (OUTPATIENT)
Dept: ORTHOPEDIC SURGERY | Facility: CLINIC | Age: 60
End: 2020-08-11
Payer: MEDICAID

## 2020-08-11 DIAGNOSIS — M67.40 GANGLION, UNSPECIFIED SITE: ICD-10-CM

## 2020-08-11 PROCEDURE — 99203 OFFICE O/P NEW LOW 30 MIN: CPT | Mod: 25

## 2020-08-11 PROCEDURE — 20612 ASPIRATE/INJ GANGLION CYST: CPT | Mod: RT

## 2020-08-25 ENCOUNTER — APPOINTMENT (OUTPATIENT)
Dept: CARDIOLOGY | Facility: CLINIC | Age: 60
End: 2020-08-25

## 2020-10-07 ENCOUNTER — APPOINTMENT (OUTPATIENT)
Dept: INTERNAL MEDICINE | Facility: CLINIC | Age: 60
End: 2020-10-07

## 2020-12-23 PROBLEM — Z12.11 ENCOUNTER FOR FIT (FECAL IMMUNOCHEMICAL TEST) SCREENING: Status: RESOLVED | Noted: 2020-06-22 | Resolved: 2020-12-23

## 2021-01-06 ENCOUNTER — RX RENEWAL (OUTPATIENT)
Age: 61
End: 2021-01-06

## 2021-01-06 RX ORDER — LEVOTHYROXINE SODIUM 0.09 MG/1
88 TABLET ORAL DAILY
Qty: 90 | Refills: 0 | Status: ACTIVE | COMMUNITY
Start: 2018-04-23 | End: 1900-01-01

## 2021-02-10 PROBLEM — K80.20 CHOLELITHIASES: Status: ACTIVE | Noted: 2021-02-10

## 2021-02-10 NOTE — HISTORY OF PRESENT ILLNESS
[de-identified] :  \par She had an abdominal sonogram on  11/17/2020 which revealed . Gallstone measuring 2.4 cm within the gallbladder .CBD is normal \par \par

## 2021-02-10 NOTE — DATA REVIEWED
[FreeTextEntry1] : Exam requested by:\par BROCK SIFUENTES MD\par [address] [place]\par [facility] [id number]\par SITE PERFORMED: FLUSHING\par SITE PHONE: [phone]\par Patient: SUSHIL WOODARD\par YOB: 1960\par Phone: [phone]\par MRN: [id number] Acc: [id number]\par Date of Exam: 11-\par  \par EXAM:  ULTRASOUND ABDOMEN COMPLETE\par \par HISTORY:  Female, 60 years-old with right upper quadrant pain.\par \par TECHNIQUE:  Using real-time ultrasonography with a high-resolution broadband phased-array curved transducer, multiplanar gray scale images were obtained and supplemented with color Doppler. Static images are provided for review.\par \par COMPARISON:  [id number] \par \par FINDINGS:  \par Liver:  There is diffuse fatty infiltration of the liver. No discrete intrahepatic lesion is seen. The visualized portion of portal and hepatic veins are unremarkable. No intrahepatic biliary duct dilatation. The liver is nonenlarged measuring 15.4 cm in length at the approximate mid clavicular line.\par \par Gallbladder: There is a 2.4 cm gallstone seen in the gallbladder. The gallstone was remeasured on film. Gallstone measuring up to 2.2 cm on prior study. There is no evidence of gallbladder wall thickening.\par \par Common Bile Duct: Normal measuring 0.4 cm diameter at the christy hepatis.\par \par Pancreas: The visualized portion of the body of the pancreas is within normal limits. The tail is obscured by overlying bowel gas. No pancreatic duct dilatation.\par \par Kidneys: Normal in size, morphology and cortical echotexture. \par No hydronephrosis, shadowing calculi or perinephric fluid.\par Right Kidney: 11.4 cm in length. \par Left Kidney:   12.0 cm in length. \par \par Spleen: Normal size, contour and echotexture measuring 8.5 cm in length. \par \par Abdominal Aorta/IVC: No evidence of abdominal aortic aneurysm. Visualized portions of the IVC were patent.\par \par IMPRESSION:\par 1. Diffuse fatty infiltration of the liver.\par 2. Gallstone measuring 2.4 cm within the gallbladder. No evidence of gallbladder wall thickening.\par \par \par \par Thank you for the opportunity to participate in the care of this patient.  \par  \par JANET JONES MD  - Electronically Signed: 11- 9:43 AM \par Physician to Physician Direct Line is: [phone]

## 2021-02-11 ENCOUNTER — APPOINTMENT (OUTPATIENT)
Dept: SURGERY | Facility: CLINIC | Age: 61
End: 2021-02-11

## 2021-02-11 DIAGNOSIS — K80.20 CALCULUS OF GALLBLADDER W/OUT CHOLECYSTITIS W/OUT OBSTRUCTION: ICD-10-CM

## 2021-03-04 ENCOUNTER — RX RENEWAL (OUTPATIENT)
Age: 61
End: 2021-03-04

## 2021-03-29 ENCOUNTER — RX RENEWAL (OUTPATIENT)
Age: 61
End: 2021-03-29

## 2021-03-29 RX ORDER — ERGOCALCIFEROL 1.25 MG/1
1.25 MG CAPSULE, LIQUID FILLED ORAL
Qty: 12 | Refills: 1 | Status: ACTIVE | COMMUNITY
Start: 2018-05-31 | End: 1900-01-01

## 2021-07-19 ENCOUNTER — RX RENEWAL (OUTPATIENT)
Age: 61
End: 2021-07-19

## 2021-08-23 ENCOUNTER — RX RENEWAL (OUTPATIENT)
Age: 61
End: 2021-08-23

## 2021-08-23 RX ORDER — LOSARTAN POTASSIUM AND HYDROCHLOROTHIAZIDE 12.5; 5 MG/1; MG/1
50-12.5 TABLET ORAL
Qty: 15 | Refills: 0 | Status: ACTIVE | COMMUNITY
Start: 2019-07-03 | End: 1900-01-01

## 2021-09-07 ENCOUNTER — RX RENEWAL (OUTPATIENT)
Age: 61
End: 2021-09-07

## 2021-09-14 ENCOUNTER — RX RENEWAL (OUTPATIENT)
Age: 61
End: 2021-09-14

## 2022-04-11 PROBLEM — Z11.59 SCREENING FOR VIRAL DISEASE: Status: ACTIVE | Noted: 2020-06-22
